# Patient Record
Sex: FEMALE | URBAN - METROPOLITAN AREA
[De-identification: names, ages, dates, MRNs, and addresses within clinical notes are randomized per-mention and may not be internally consistent; named-entity substitution may affect disease eponyms.]

---

## 2019-03-29 ENCOUNTER — Encounter (OUTPATIENT)
Dept: URBAN - METROPOLITAN AREA CLINIC 75 | Facility: CLINIC | Age: 69
End: 2019-03-29
Payer: MEDICARE

## 2019-03-29 PROCEDURE — 92014 COMPRE OPH EXAM EST PT 1/>: CPT | Performed by: OPTOMETRIST

## 2019-03-29 PROCEDURE — 92250 FUNDUS PHOTOGRAPHY W/I&R: CPT | Performed by: OPTOMETRIST

## 2020-03-02 ENCOUNTER — OFFICE VISIT (OUTPATIENT)
Dept: URBAN - METROPOLITAN AREA CLINIC 75 | Facility: CLINIC | Age: 70
End: 2020-03-02
Payer: MEDICARE

## 2020-03-02 DIAGNOSIS — H52.4 PRESBYOPIA: ICD-10-CM

## 2020-03-02 DIAGNOSIS — E11.3493 TYPE 2 DIAB WITH SEVERE NONP RTNOP WITHOUT MACULAR EDEMA, BI: Primary | ICD-10-CM

## 2020-03-02 PROCEDURE — 99214 OFFICE O/P EST MOD 30 MIN: CPT | Performed by: OPTOMETRIST

## 2020-03-02 ASSESSMENT — INTRAOCULAR PRESSURE
OS: 13
OD: 12

## 2020-03-02 NOTE — IMPRESSION/PLAN
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 Plan: Discussed diagnosis in detail with patient. Will continue to observe condition and or symptoms. Pt counseled to manage diabetes.

## 2020-07-06 ENCOUNTER — OFFICE VISIT (OUTPATIENT)
Dept: URBAN - METROPOLITAN AREA CLINIC 75 | Facility: CLINIC | Age: 70
End: 2020-07-06
Payer: COMMERCIAL

## 2020-07-06 PROCEDURE — 99214 OFFICE O/P EST MOD 30 MIN: CPT | Performed by: OPTOMETRIST

## 2020-07-06 ASSESSMENT — INTRAOCULAR PRESSURE
OS: 13
OD: 14

## 2020-07-06 NOTE — IMPRESSION/PLAN
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 Plan: Will continue to observe condition.

## 2021-01-06 ENCOUNTER — OFFICE VISIT (OUTPATIENT)
Dept: URBAN - METROPOLITAN AREA CLINIC 75 | Facility: CLINIC | Age: 71
End: 2021-01-06
Payer: COMMERCIAL

## 2021-01-06 DIAGNOSIS — E10.3493 TYPE 1 DIAB W SEVERE NONPRLF DIAB RTNOP W/O MACULAR EDEMA, BILATERAL: Primary | ICD-10-CM

## 2021-01-06 DIAGNOSIS — H35.63 RETINAL HEMORRHAGE, BILATERAL: ICD-10-CM

## 2021-01-06 DIAGNOSIS — S06.0X9S: ICD-10-CM

## 2021-01-06 DIAGNOSIS — Z96.1 PRESENCE OF INTRAOCULAR LENS: ICD-10-CM

## 2021-01-06 PROCEDURE — 99214 OFFICE O/P EST MOD 30 MIN: CPT | Performed by: OPTOMETRIST

## 2021-01-06 RX ORDER — PREDNISONE 5 MG/1
5 MG TABLET ORAL
Qty: 0 | Refills: 0 | Status: ACTIVE
Start: 2021-01-06

## 2021-01-06 NOTE — IMPRESSION/PLAN
Impression: Concussion w loss of consciousness of unsp duration, sequela: S06.0X9S. Plan: Pt following w/ PCP.

## 2021-01-06 NOTE — IMPRESSION/PLAN
Impression: Retinal hemorrhage, bilateral: H35.63. Plan: Discussed findings in detail with patient. Will continue to observe condition and or symptoms.

## 2021-01-06 NOTE — IMPRESSION/PLAN
Impression: Type 1 diab w severe nonprlf diab rtnop w/o macular edema, bilateral: e10.3493. Plan: Diabetes type I: moderated background diabetic retinopathy, no signs of neovascularization noted. Discussed ocular and systemic benefits of maintaining blood sugar control.

## 2021-04-06 ENCOUNTER — OFFICE VISIT (OUTPATIENT)
Dept: URBAN - METROPOLITAN AREA CLINIC 75 | Facility: CLINIC | Age: 71
End: 2021-04-06
Payer: COMMERCIAL

## 2021-04-06 PROCEDURE — 99214 OFFICE O/P EST MOD 30 MIN: CPT | Performed by: OPTOMETRIST

## 2021-04-06 ASSESSMENT — INTRAOCULAR PRESSURE
OD: 14
OS: 14

## 2021-04-06 NOTE — IMPRESSION/PLAN
Impression: Hypertensive retinopathy, bilateral: H35.033. Plan: OU: Discussed diagnosis in detail with patient. Emphasized blood pressure, cholesterol and sugar control.

## 2021-04-06 NOTE — IMPRESSION/PLAN
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 Plan: Diabetes type I: severe background diabetic retinopathy, no signs of neovascularization noted. Discussed ocular and systemic benefits of maintaining blood sugar control.

## 2021-07-06 ENCOUNTER — OFFICE VISIT (OUTPATIENT)
Dept: URBAN - METROPOLITAN AREA CLINIC 75 | Facility: CLINIC | Age: 71
End: 2021-07-06
Payer: COMMERCIAL

## 2021-07-06 DIAGNOSIS — H35.033 HYPERTENSIVE RETINOPATHY, BILATERAL: ICD-10-CM

## 2021-07-06 DIAGNOSIS — E10.3393 TYPE 1 DIABETES MELLITUS WITH MODERATE NONPROLIFERATIVE DIABETIC RETINOPATHY WITHOUT MACULAR EDEMA, BILATERAL: Primary | ICD-10-CM

## 2021-07-06 DIAGNOSIS — I63.9 STROKE: ICD-10-CM

## 2021-07-06 PROCEDURE — 99214 OFFICE O/P EST MOD 30 MIN: CPT | Performed by: OPTOMETRIST

## 2021-07-06 ASSESSMENT — INTRAOCULAR PRESSURE
OS: 14
OD: 13

## 2021-07-06 NOTE — IMPRESSION/PLAN
Impression: Benign neoplasm of left choroid: D31.32-The clinical exam and optos photo  is consistent with a choroidal nevus. Plan: The diagnosis, natural history, and prognosis of choroidal nevi were discussed. The patient understands that there is a small risk of malignant transformation, and the importance of routine dilated eye exams was stressed. Baseline color photos were obtained for future comparison.

## 2021-07-06 NOTE — IMPRESSION/PLAN
Impression: Type 1 diabetes mellitus with moderate nonproliferative diabetic retinopathy without macular edema, bilateral: W25.4882- Optos performed. Moderate- Severe background diabetic retinopathy, no signs of neovascularization noted. Plan:  No treatment necessary at this time. Patient was instructed to monitor vision for sudden changes and to call if visual changes noted. Discussed ocular and systemic benefits of blood sugar control.

## 2021-12-09 ENCOUNTER — OFFICE VISIT (OUTPATIENT)
Dept: URBAN - METROPOLITAN AREA CLINIC 75 | Facility: CLINIC | Age: 71
End: 2021-12-09
Payer: COMMERCIAL

## 2021-12-09 DIAGNOSIS — D31.32 BENIGN NEOPLASM OF LEFT CHOROID: ICD-10-CM

## 2021-12-09 PROCEDURE — 99214 OFFICE O/P EST MOD 30 MIN: CPT | Performed by: OPTOMETRIST

## 2021-12-09 ASSESSMENT — INTRAOCULAR PRESSURE
OS: 14
OD: 12

## 2021-12-09 NOTE — IMPRESSION/PLAN
Impression: Benign neoplasm of left choroid: D31.32-The clinical exam and optos photo  is consistent with a choroidal nevus. Baseline color photos were obtained for future comparison. Plan:  The diagnosis, natural history, and prognosis of choroidal nevi were discussed. The patient understands that there is a small risk of malignant transformation, and the importance of routine dilated eye exams was stressed.